# Patient Record
Sex: MALE | Race: WHITE
[De-identification: names, ages, dates, MRNs, and addresses within clinical notes are randomized per-mention and may not be internally consistent; named-entity substitution may affect disease eponyms.]

---

## 2017-03-13 ENCOUNTER — HOSPITAL ENCOUNTER (EMERGENCY)
Age: 22
Discharge: HOME | End: 2017-03-13
Payer: COMMERCIAL

## 2017-03-13 ENCOUNTER — HOSPITAL ENCOUNTER (OUTPATIENT)
Age: 22
Discharge: ANOTHER HOSPITAL | End: 2017-03-13
Payer: MEDICAID

## 2017-03-13 DIAGNOSIS — J06.9: ICD-10-CM

## 2017-03-13 DIAGNOSIS — R00.0: ICD-10-CM

## 2017-03-13 DIAGNOSIS — R55: ICD-10-CM

## 2017-03-13 DIAGNOSIS — R05: ICD-10-CM

## 2017-03-13 DIAGNOSIS — J40: Primary | ICD-10-CM

## 2017-03-13 DIAGNOSIS — R42: Primary | ICD-10-CM

## 2017-03-13 DIAGNOSIS — L02.91: ICD-10-CM

## 2017-03-13 PROCEDURE — 93010 ELECTROCARDIOGRAM REPORT: CPT

## 2017-03-13 PROCEDURE — 99284 EMERGENCY DEPT VISIT MOD MDM: CPT

## 2017-03-13 PROCEDURE — 93005 ELECTROCARDIOGRAM TRACING: CPT

## 2017-03-13 PROCEDURE — 94640 AIRWAY INHALATION TREATMENT: CPT

## 2019-08-14 ENCOUNTER — HOSPITAL ENCOUNTER (EMERGENCY)
Dept: HOSPITAL 76 - ED | Age: 24
Discharge: HOME | End: 2019-08-14
Payer: COMMERCIAL

## 2019-08-14 VITALS — SYSTOLIC BLOOD PRESSURE: 131 MMHG | DIASTOLIC BLOOD PRESSURE: 92 MMHG

## 2019-08-14 DIAGNOSIS — L08.9: ICD-10-CM

## 2019-08-14 DIAGNOSIS — L02.414: Primary | ICD-10-CM

## 2019-08-14 PROCEDURE — 10060 I&D ABSCESS SIMPLE/SINGLE: CPT

## 2019-08-14 NOTE — ED PHYSICIAN DOCUMENTATION
History of Present Illness





- Stated complaint


Stated Complaint: SWOLLEN SKIN





- Chief complaint


Chief Complaint: Wound





- Additonal information


Additional information: 





This is a 23-year-old male who presents with redness and pain of his left f

orearm.  For the last year patient has had a rash over his forearms, parts of 

his face, as well as his upper chest.  He states that he will get  scattered 

pustules and red papules that appear to be like acne, these will sometimes open,

or sometimes fade away. These occur over the dorsal surface of his arms as well 

as the upper chest. The palms, legs, abdomen, back, and upper face are spared.  

He will scratch at them occasionally and they will scab, and subsequently scar. 

He has tried doing chlorhexidine washes, this has not improved the rash.  Prior 

to a year ago he did not have any acne/rash.  He did see a dermatologist who 

Thought this rash was acne, and prescribed him doxycycline, which he has been 

taking without significant improvement.  He also was previously on Bactrim, 

however this gave him intense sinus headaches so he stopped this.  He denies any

other symptoms such as, chest pain, or other rash.  He denies any blistering of 

the rash. No IVDU, no meth or other drug use. Over the past week he has had some

redness and swelling of his left forearm which began as a typical lesion and 

then has continued to spread.  He actually thinks this has improved over the 

past several days and the size of lesion has gone down somewhat, but he is 

having significant pain so he presented to the emergency department tonight





Review of Systems


Constitutional: denies: Fever


Throat: denies: Oral lesions / sores


Cardiac: denies: Chest pain / pressure


Respiratory: denies: Dyspnea


GI: denies: Abdominal Pain


Skin: reports: Rash





PD PAST MEDICAL HISTORY





- Past Medical History


Past Medical History: Yes


Cardiovascular: None


Respiratory: None


Endocrine/Autoimmune: None


Derm: Other


Other Past Medical History: Acne





- Past Surgical History


Past Surgical History: No





- Present Medications


Home Medications: 


                                Ambulatory Orders











 Medication  Instructions  Recorded  Confirmed


 


RX: Albuterol Sulfate [Proair Hfa 2 puffs IH QID #1 hfa.aer.ad 03/13/17 08/14/19





Inhaler]   


 


RX: Doxycycline Hyclate 100 mg PO BID #14 tablet 03/13/17 08/14/19


 


Clindamycin HCl [Clindamycin 150MG 450 mg PO TID 10 Days #90 capsule 08/14/19 





CAP]   














- Allergies


Allergies/Adverse Reactions: 


                                    Allergies











Allergy/AdvReac Type Severity Reaction Status Date / Time


 


Sulfa (Sulfonamide AdvReac  Headache Verified 08/14/19 03:20





Antibiotics)     














- Social History


Does the pt smoke?: No


Smoking Status: Never smoker


Does the pt drink ETOH?: Yes


ETOH Use: Liquor


Does the pt have substance abuse?: No





- Immunizations


Immunizations are current?: Yes





- POLST


Patient has POLST: No





PD ED PE NORMAL





- Vitals


Vital signs reviewed: Yes





- General


General: Alert and oriented X 3, No acute distress





- HEENT


HEENT: Atraumatic





- Cardiac


Cardiac: RRR, No murmur





- Respiratory


Respiratory: No respiratory distress





- Abdomen


Abdomen: Non distended





- Derm


Derm: Other (There are scattered papules which are up to 1 cm in diameter over 

the dorsal forearms and extending up to the shoulders as well as across the 

chest and the lower face.  Some of these lesions have Small central crusts and 

excoriations.  On the left forearm on the volar surface in the mid forearm 4 cm 

x 2 cm erythema and induration with a small amount of central fluctuance.  There

is no active drainage from the lesion.  There is no tenderness with palpation 

along the flexor tendons.  Patient is able to flex and extend his fingers and 

wrist without issue.  Radial and ulnar pulses are 2+, sensation over the left 

hand is normal)





- Extremities


Extremities: No deformity





- Neuro


Neuro: Alert and oriented X 3





- Psych


Psych: Normal mood, Normal affect





Results





- Vitals


Vitals: 


                               Vital Signs - 24 hr











  08/14/19 08/14/19





  03:10 04:24


 


Temperature 36.0 C L 


 


Heart Rate 109 H 84


 


Respiratory 16 16





Rate  


 


Blood Pressure 160/119 H 131/92 H


 


O2 Saturation 100 99








                                     Oxygen











O2 Source                      Room air

















Procedures





- Abscess I&D (location)


  ** Upper extremity


Preparation: Lidocaine 1%


Incision: Incised with scalpel, Irrigated, Other (2 cc of a mixed 

serosanguineous/purulent drainage was expressed, followed by sanguinous 

drainage. Great care was taken to only incise superficially within the top 0.75 

cm of tissue and avoid any vessels, tendons, or other structures of the forearm.

Ultrasound was used to confirm that the abscess cavity had been drained. There 

is another area More proximal in the arm with a small 0.5 cm x 0.5 cm fluid 

collection which appears to small to drain at this time.)


Other: Pt tolerated well, Dressing applied, Antibiotic prescribed





PD MEDICAL DECISION MAKING





- ED course


Complexity details: considered differential (Cellulitis, abscess, phlegmon, 

acne, porphyria, MRSA infection, Environmental exposure/allergy, NSTI, 

superficial thrombophlebitis)


ED course: 





On initial examination patient is tachycardic in triage but this is resolved at 

the time of my physical exam.  He is well-appearing and nontoxic, he has not had

any fever.  He has scattered lesions over his forearms chest and face, which he 

has appeared to pick at, and they are in various stages of healing.  There are 

no blisters, no mucosal involvement.  Given that these have been present for a 

year, I am not improved with antibiotics, I am concerned that there is another 

underlying medical problem that may be causing these lesions, though he does not

have other obvious symptoms of porphyria or any systemic disease.  He works in a

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 

affect areas of his body which are covered by clothes as well as those exposed 

to the sun in environment.  He denies drug use and specifically methamphetamine 

use. He has an area which appears to be indurated/cellulitic or possible abscess

of the left forearm, with ultrasound there is a small 1 cm x 1 cm action in the 

volar surface of the forearm.  This was drained as noted above, the incision 

made was small because the fluid collection on US was also small, and great care

was taken to avoid any of the deeper structures of the arm.  He does not have 

signs of flexor tenosynovitis or myositis or deeper infection at this time.  

There was minimal purulent drainage from the I&D, and he has several small 

subcentimeter fluid collections visible on ultrasound which I do not feel are 

amenable to drainage, and rate may represent early phlegmon/abscess.  Given that

he has had continued rash will be on doxycycline we will trial clindamycin since

he does not tolerate Bactrim.  I prescribed him a 10-day course of Bactrim with 

instructions to stop the doxycycline.  I also feel that he should follow-up with

a dermatologist for second opinion given that his rash began a year ago in the 

absence of any prior history of acne.  The distribution and the onset is not 

classic for acne.  After the incision and drainage site was dressed and 

hemostatic, I marked the edges of erythema of his forearm, and asked him to 

return to the emergency department if the redness progressed centimeter past 

these margins, or if he has any other concerning symptoms such as pain with 

flexion of his wrist or hands, fever, or any other concerning symptoms. His 

history and exam are not consistent with NSTI at this time. I did not see signs 

of superficial thrombophlebitis on US. He was given the first dose of 

clindamycin here in the ED. Vital signs are unremarkable, HR remains normal at 

the time of discharge. Return precautions were discussed and patient was 

discharged home.





Departure





- Departure


Disposition: 01 Home, Self Care


Clinical Impression: 


 Abscess





Condition: Stable


Instructions:  ED Abscess IandD


Follow-Up: 


Your,PCP [Other]


Prescriptions: 


Clindamycin HCl [Clindamycin 150MG CAP] 450 mg PO TID 10 Days #90 capsule


Comments: 


You were seen today for a skin infection.  You appeared to have a small abscess,

as well as a cellulitis/infection of the surrounding skin.  Please take the 

clindamycin as directed (you may stop the doxycycline while taking clindamycin),

and return to the emergency department or see your primary care provider within 

the next 24 to 48 hours for a recheck.  If you are having pain along the tendons

of your arm, fever, or if the redness progresses past the marked area more than 

1 cm, please return to the emergency department. Please also follow-up with a 

dermatologist for the chronic skin lesions/rash on your arms that you have had.


Forms:  Activity restrictions


Discharge Date/Time: 08/14/19 04:29

## 2021-01-21 ENCOUNTER — HOSPITAL ENCOUNTER (OUTPATIENT)
Dept: HOSPITAL 76 - DI.N | Age: 26
Discharge: HOME | End: 2021-01-21
Attending: ORTHOPAEDIC SURGERY
Payer: COMMERCIAL

## 2021-01-21 DIAGNOSIS — M25.511: Primary | ICD-10-CM

## 2021-01-21 NOTE — XRAY REPORT
PROCEDURE:  Shoulder 3 View RT

 

INDICATIONS:  RT SHOULDER PAIN

 

TECHNIQUE:  4 views of the shoulder were acquired.  

 

COMPARISON:  None.

 

FINDINGS:  

 

Bones:  No fractures or dislocations.  No suspicious bony lesions.  Visualized ribs appear intact.  

 

Soft tissues:  No suspicious soft tissue calcifications.  

 

 

IMPRESSION:  

 

 No osseous lesion. If there are persistent symptoms or continued clinical concern for pathology, the
n repeat plain film radiographs (7-10 days) or advanced imaging (CT, MR, bone scan) should be conside
red for further evaluation.

 

Reviewed by: Maritza Crump MD, PhD on 1/21/2021 4:47 PM [facility]

Approved by: Maritza Crump MD, PhD on 1/21/2021 4:47 PM [facility]

 

 

Station ID:  [id number]

## 2021-09-03 ENCOUNTER — HOSPITAL ENCOUNTER (OUTPATIENT)
Dept: HOSPITAL 76 - COV | Age: 26
Discharge: HOME | End: 2021-09-03
Attending: FAMILY MEDICINE
Payer: MEDICAID

## 2021-09-03 DIAGNOSIS — Z20.822: Primary | ICD-10-CM
